# Patient Record
Sex: MALE | Race: OTHER | NOT HISPANIC OR LATINO
[De-identification: names, ages, dates, MRNs, and addresses within clinical notes are randomized per-mention and may not be internally consistent; named-entity substitution may affect disease eponyms.]

---

## 2019-05-22 ENCOUNTER — APPOINTMENT (OUTPATIENT)
Dept: UROLOGY | Facility: CLINIC | Age: 59
End: 2019-05-22
Payer: COMMERCIAL

## 2019-05-22 VITALS
HEART RATE: 68 BPM | SYSTOLIC BLOOD PRESSURE: 148 MMHG | DIASTOLIC BLOOD PRESSURE: 90 MMHG | HEIGHT: 75 IN | RESPIRATION RATE: 18 BRPM | BODY MASS INDEX: 28.6 KG/M2 | WEIGHT: 230 LBS | TEMPERATURE: 98.1 F

## 2019-05-22 DIAGNOSIS — Z00.00 ENCOUNTER FOR GENERAL ADULT MEDICAL EXAMINATION W/OUT ABNORMAL FINDINGS: ICD-10-CM

## 2019-05-22 LAB
BASOPHILS # BLD AUTO: 0.07 K/UL
BASOPHILS NFR BLD AUTO: 1.7 %
EOSINOPHIL # BLD AUTO: 0.19 K/UL
EOSINOPHIL NFR BLD AUTO: 4.5 %
HCT VFR BLD CALC: 52.3 %
HGB BLD-MCNC: 16.1 G/DL
IMM GRANULOCYTES NFR BLD AUTO: 0.5 %
LYMPHOCYTES # BLD AUTO: 1.8 K/UL
LYMPHOCYTES NFR BLD AUTO: 43.1 %
MAN DIFF?: NORMAL
MCHC RBC-ENTMCNC: 28 PG
MCHC RBC-ENTMCNC: 30.8 GM/DL
MCV RBC AUTO: 91.1 FL
MONOCYTES # BLD AUTO: 0.4 K/UL
MONOCYTES NFR BLD AUTO: 9.6 %
NEUTROPHILS # BLD AUTO: 1.7 K/UL
NEUTROPHILS NFR BLD AUTO: 40.6 %
PLATELET # BLD AUTO: 158 K/UL
PSA FREE FLD-MCNC: 45 %
PSA FREE SERPL-MCNC: 0.73 NG/ML
PSA SERPL-MCNC: 1.62 NG/ML
RBC # BLD: 5.74 M/UL
RBC # FLD: 12.9 %
WBC # FLD AUTO: 4.18 K/UL

## 2019-05-22 PROCEDURE — 99204 OFFICE O/P NEW MOD 45 MIN: CPT

## 2019-05-22 RX ORDER — HYDROCHLOROTHIAZIDE 12.5 MG/1
12.5 CAPSULE ORAL
Qty: 30 | Refills: 0 | Status: ACTIVE | COMMUNITY
Start: 2018-10-12

## 2019-05-22 RX ORDER — CLOTRIMAZOLE AND BETAMETHASONE DIPROPIONATE 10; .5 MG/G; MG/G
1-0.05 CREAM TOPICAL TWICE DAILY
Qty: 1 | Refills: 2 | Status: ACTIVE | COMMUNITY
Start: 2019-05-22 | End: 1900-01-01

## 2019-05-22 RX ORDER — OMEPRAZOLE 20 MG/1
20 TABLET, DELAYED RELEASE ORAL
Qty: 56 | Refills: 0 | Status: ACTIVE | COMMUNITY
Start: 2019-04-15

## 2019-05-22 RX ORDER — NEBIVOLOL HYDROCHLORIDE 10 MG/1
10 TABLET ORAL
Qty: 90 | Refills: 0 | Status: ACTIVE | COMMUNITY
Start: 2018-10-12

## 2019-05-22 RX ORDER — OMEPRAZOLE 40 MG/1
40 CAPSULE, DELAYED RELEASE ORAL
Qty: 90 | Refills: 0 | Status: ACTIVE | COMMUNITY
Start: 2018-06-18

## 2019-05-22 RX ORDER — AMLODIPINE BESYLATE 5 MG/1
5 TABLET ORAL
Qty: 90 | Refills: 0 | Status: ACTIVE | COMMUNITY
Start: 2018-12-26

## 2019-05-22 NOTE — PHYSICAL EXAM
[General Appearance - Well Developed] : well developed [Normal Appearance] : normal appearance [] : no respiratory distress [Normal Station and Gait] : the gait and station were normal for the patient's age [Oriented To Time, Place, And Person] : oriented to person, place, and time [Affect] : the affect was normal [Heart Rate And Rhythm] : Heart rate and rhythm were normal [Edema] : no peripheral edema [Abdomen Soft] : soft [Abdomen Tenderness] : non-tender [Costovertebral Angle Tenderness] : no ~M costovertebral angle tenderness [Penis Abnormality] : normal circumcised penis [No Palpable Adenopathy] : no palpable adenopathy [Cervical Lymph Nodes Enlarged Posterior Bilaterally] : posterior cervical [Cervical Lymph Nodes Enlarged Anterior Bilaterally] : anterior cervical [FreeTextEntry1] : no submandibular gland tenderness

## 2019-05-22 NOTE — REVIEW OF SYSTEMS
[Nocturia] : nocturia [Negative] : Heme/Lymph [Feeling Poorly] : not feeling poorly [Eyesight Problems] : no eyesight problems [Nosebleeds] : no nosebleeds [Chest Pain] : no chest pain [Cough] : no cough [Constipation] : no constipation [Joint Pain] : no joint pain [Joint Swelling] : no joint swelling [Convulsions] : no convulsions [Fainting] : no fainting [Anxiety] : no anxiety [Depression] : no depression [Erectile Dysfunction] : no erectile dysfunction [Easy Bleeding] : no tendency for easy bleeding [Easy Bruising] : no tendency for easy bruising

## 2019-05-22 NOTE — ASSESSMENT
[FreeTextEntry1] : Mr. Murdock presents today for an initial evaluation. 3 years ago he was diagnosed with colon cancer. Last year he was diagnosed with lung cancer. He under went chemotherapy and as of today he is cancer free. He has seen another provider who measured his PSA and it is reported that his PSA has increased from 1 to 2 over a two year period. Prostate is enlarged 5.5 cm, kidney normal size, no renal mass as per a CT scan completed 4/3/19. The CT scan was completed to monitor the colon cancer. He notes seeing me today as oppose to his previous provider due to a change in insurance. Regarding his urination he reports not having any issues. When drinking a lot of tea during the evening he will wake up 2-3 x a night to urinate. Otherwise he will not wake up at all or only once at night. He denies dysuria, gross hematuria, urgency or incontinence. Stones in the gal bladder are reported as well. He is also concerned about itching of the scrotum. Additionally he c/o neuropathy in the feet bilaterally which he believes as a result of chemotherapy. \par \par Digital rectal exam found no suspicious rectal masses. Anal tone is normal. The prostate is non tender with normal texture, discrete borders and no nodules. It is a 30 gram transurethral resection size. \par \par The patient produced a urine sample which will be sent for urinalysis, urine cytology and urine culture. \par Blood work today included comprehensive metabolic panel, CBC, PSA and testosterone. \par \par Upon completion of his physical examination I discovered slight swelling on the left side of the scrotum. Therefore I will prescribe a topical cream i.e. Lotrisone to be used. \par \par He is to follow up in 6 months or sooner if clinically indicated.

## 2019-05-22 NOTE — ADDENDUM
[FreeTextEntry1] : This note was authored by Steven Rodriguez working as a medical scribe for Dr. Chris Andrade. The note was reviewed, edited, and revised by Dr. Chris Andrade who is in agreement with the exam findings, and treatment plan. (5/22/19)

## 2019-05-22 NOTE — LETTER BODY
[Dear  ___] : Dear  [unfilled], [Consult Letter:] : I had the pleasure of evaluating your patient, [unfilled]. [Please see my note below.] : Please see my note below. [Consult Closing:] : Thank you very much for allowing me to participate in the care of this patient.  If you have any questions, please do not hesitate to contact me. [Sincerely,] : Sincerely, [( Thank you for referring [unfilled] for consultation for _____ )] : Thank you for referring [unfilled] for consultation for [unfilled] [FreeTextEntry2] : Tian Ferguson MD\par [address]\par [place] [address] [FreeTextEntry1] : Mr. Murdock presents today for an initial evaluation. 3 years ago he was diagnosed with colon cancer. Last year he was diagnosed with lung cancer. He under went chemotherapy and as of today he is cancer free. He has seen another provider who measured his PSA and it is reported that his PSA has increased from 1 to 2 over a two year period. Prostate is enlarged 5.5 cm, kidney normal size, no renal mass as per a CT scan completed 4/3/19. The CT scan was completed to monitor the colon cancer. He notes seeing me today as oppose to his previous provider due to a change in insurance. Regarding his urination he reports not having any issues. When drinking a lot of tea during the evening he will wake up 2-3 x a night to urinate. Otherwise he will not wake up at all or only once at night. He denies dysuria, gross hematuria, urgency or incontinence. Stones in the gal bladder are reported as well. He is also concerned about itching of the scrotum. Additionally he c/o neuropathy in the feet bilaterally which he believes as a result of chemotherapy. \par \par Digital rectal exam found no suspicious rectal masses. Anal tone is normal. The prostate is non tender with normal texture, discrete borders and no nodules. It is a 30 gram transurethral resection size. \par \par The patient produced a urine sample which will be sent for urinalysis, urine cytology and urine culture. \par Blood work today included comprehensive metabolic panel, CBC, PSA and testosterone. \par \par Upon completion of his physical examination I discovered slight swelling on the left side of the scrotum. Therefore I will prescribe a topical cream i.e. Lotrisone to be used. \par \par He is to follow up in 6 months or sooner if clinically indicated.  [FreeTextEntry3] : Chris Andrade M.D. PhD

## 2019-05-22 NOTE — HISTORY OF PRESENT ILLNESS
[FreeTextEntry1] : Mr. Murdock presents today for an initial evaluation. 3 years ago he was diagnosed with colon cancer. 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 He under went chemotherapy and as of today he is cancer free. He has seen another provider who measured his PSA and it is reported that his PSA has increased from 1 to 2 over a two year period. Prostate is enlarged 5.5 cm, kidney normal size, no renal mass as per a CT scan completed 4/3/19. The CT scan was completed to monitor the colon cancer. He notes seeing me today as oppose to his previous provider due to a change in insurance. Regarding his urination he reports not having any issues. When drinking a lot of tea during the evening he will wake up 2-3 x a night to urinate. Otherwise he will not wake up at all or only once at night. He denies dysuria, gross hematuria, urgency or incontinence. Stones in the gal bladder are reported as well. He is also concerned about itching of the scrotum. Additionally he c/o neuropathy in the feet bilaterally which he believes as a result of chemotherapy.

## 2019-05-25 LAB
ALBUMIN SERPL ELPH-MCNC: 4.4 G/DL
ALP BLD-CCNC: 131 U/L
ALT SERPL-CCNC: 28 U/L
ANION GAP SERPL CALC-SCNC: 18 MMOL/L
APPEARANCE: CLEAR
AST SERPL-CCNC: 27 U/L
BACTERIA UR CULT: NORMAL
BACTERIA: NEGATIVE
BILIRUB SERPL-MCNC: 0.7 MG/DL
BILIRUBIN URINE: NEGATIVE
BLOOD URINE: NEGATIVE
BUN SERPL-MCNC: 16 MG/DL
CALCIUM SERPL-MCNC: 10.1 MG/DL
CEA SERPL-MCNC: 1.4 NG/ML
CHLORIDE SERPL-SCNC: 103 MMOL/L
CO2 SERPL-SCNC: 22 MMOL/L
COLOR: NORMAL
CREAT SERPL-MCNC: 0.84 MG/DL
GLUCOSE QUALITATIVE U: NEGATIVE
GLUCOSE SERPL-MCNC: 93 MG/DL
HYALINE CASTS: 0 /LPF
KETONES URINE: NEGATIVE
LEUKOCYTE ESTERASE URINE: NEGATIVE
MICROSCOPIC-UA: NORMAL
NITRITE URINE: NEGATIVE
PH URINE: 6.5
POTASSIUM SERPL-SCNC: 4.5 MMOL/L
PROT SERPL-MCNC: 7.1 G/DL
PROTEIN URINE: NEGATIVE
RED BLOOD CELLS URINE: 0 /HPF
SODIUM SERPL-SCNC: 143 MMOL/L
SPECIFIC GRAVITY URINE: 1.01
SQUAMOUS EPITHELIAL CELLS: 0 /HPF
TESTOST SERPL-MCNC: 368 NG/DL
URINE CYTOLOGY: NORMAL
UROBILINOGEN URINE: NORMAL
WHITE BLOOD CELLS URINE: 0 /HPF

## 2019-12-02 ENCOUNTER — APPOINTMENT (OUTPATIENT)
Dept: UROLOGY | Facility: CLINIC | Age: 59
End: 2019-12-02
Payer: COMMERCIAL

## 2019-12-02 VITALS — SYSTOLIC BLOOD PRESSURE: 142 MMHG | DIASTOLIC BLOOD PRESSURE: 82 MMHG | TEMPERATURE: 98.1 F | HEART RATE: 65 BPM

## 2019-12-02 PROCEDURE — 99214 OFFICE O/P EST MOD 30 MIN: CPT

## 2019-12-02 NOTE — HISTORY OF PRESENT ILLNESS
[FreeTextEntry1] : Mr. Murdock presents today for an initial evaluation. 3 years ago he was diagnosed with colon cancer. 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 He under went chemotherapy and as of today he is cancer free. He has seen another provider who measured his PSA and it is reported that his PSA has increased from 1 to 2 over a two year period. Prostate is enlarged 5.5 cm, kidney normal size, no renal mass as per a CT scan completed 4/3/19. The CT scan was completed to monitor the colon cancer. He notes seeing me today as oppose to his previous provider due to a change in insurance. Regarding his urination he reports not having any issues. When drinking a lot of tea during the evening he will wake up 2-3 x a night to urinate. Otherwise he will not wake up at all or only once at night. He denies dysuria, gross hematuria, urgency or incontinence. Stones in the gal bladder are reported as well. He is also concerned about itching of the scrotum. Additionally he c/o neuropathy in the feet bilaterally which he believes as a result of chemotherapy. \par \par 12/2/19: Patient presents today for a follow up. Pt states he sees his oncologist once a month to get blood work. Patients hematocrit numbers are from  3/19 was 48.6, 6/19 was 48.4 and 8/19 was 40.1. Left lower lobe colon cancer excised and remains cancer free. CT chest and abdomen from 10/16/19 shows  thyroid nodules, stable 3mm left upper lobe nodule, right subcutaneous port, prostate was 5.9 cm. Patient denies dysuria, gross hematuria, urgency, or incontinence, pushing or straining. Pt states he wakes up about 1-2 times at night to urinate if he drinks a lot of tea before bed.

## 2019-12-02 NOTE — PHYSICAL EXAM
[General Appearance - Well Developed] : well developed [Abdomen Soft] : soft [Normal Appearance] : normal appearance [Abdomen Tenderness] : non-tender [Costovertebral Angle Tenderness] : no ~M costovertebral angle tenderness [Heart Rate And Rhythm] : Heart rate and rhythm were normal [Edema] : no peripheral edema [] : no respiratory distress [Affect] : the affect was normal [Oriented To Time, Place, And Person] : oriented to person, place, and time [Normal Station and Gait] : the gait and station were normal for the patient's age [No Palpable Adenopathy] : no palpable adenopathy [Cervical Lymph Nodes Enlarged Posterior Bilaterally] : posterior cervical [Cervical Lymph Nodes Enlarged Anterior Bilaterally] : anterior cervical [Penis Abnormality] : normal circumcised penis [FreeTextEntry1] : no submandibular gland tenderness

## 2019-12-02 NOTE — ADDENDUM
[FreeTextEntry1] : This note was authored by Yocasta Velez working as a scribe for Dr. Chris Andrade\par

## 2019-12-03 LAB
APPEARANCE: CLEAR
BILIRUBIN URINE: NEGATIVE
BLOOD URINE: NEGATIVE
COLOR: NORMAL
GLUCOSE QUALITATIVE U: NEGATIVE
KETONES URINE: NEGATIVE
LEUKOCYTE ESTERASE URINE: NEGATIVE
NITRITE URINE: NEGATIVE
PH URINE: 6.5
PROTEIN URINE: NEGATIVE
PSA SERPL-MCNC: 1.48 NG/ML
SPECIFIC GRAVITY URINE: 1.02
TESTOST SERPL-MCNC: 395 NG/DL
URINE CYTOLOGY: NORMAL
UROBILINOGEN URINE: NORMAL

## 2019-12-08 LAB
ALKPISO INTERP: NORMAL
ALP BLD-CCNC: 118 U/L
ALP BONE CFR SERPL: 41 %
ALP INTEST CFR SERPL: 0 %
ALP LIVER CFR SERPL: 59 %
ALP MACRO CFR SERPL: 0 %
ALP PLAC CFR SERPL: 0 %

## 2020-06-08 ENCOUNTER — APPOINTMENT (OUTPATIENT)
Dept: UROLOGY | Facility: CLINIC | Age: 60
End: 2020-06-08

## 2020-07-15 ENCOUNTER — APPOINTMENT (OUTPATIENT)
Dept: UROLOGY | Facility: CLINIC | Age: 60
End: 2020-07-15

## 2020-07-23 ENCOUNTER — LABORATORY RESULT (OUTPATIENT)
Age: 60
End: 2020-07-23

## 2020-07-23 ENCOUNTER — APPOINTMENT (OUTPATIENT)
Dept: UROLOGY | Facility: CLINIC | Age: 60
End: 2020-07-23
Payer: COMMERCIAL

## 2020-07-23 VITALS
BODY MASS INDEX: 26.11 KG/M2 | HEIGHT: 75 IN | HEART RATE: 73 BPM | RESPIRATION RATE: 18 BRPM | SYSTOLIC BLOOD PRESSURE: 143 MMHG | DIASTOLIC BLOOD PRESSURE: 83 MMHG | WEIGHT: 210 LBS

## 2020-07-23 VITALS — TEMPERATURE: 97.6 F

## 2020-07-23 PROCEDURE — 99214 OFFICE O/P EST MOD 30 MIN: CPT

## 2020-07-26 LAB
APPEARANCE: ABNORMAL
BILIRUBIN URINE: NEGATIVE
BLOOD URINE: NEGATIVE
COLOR: YELLOW
GLUCOSE QUALITATIVE U: NEGATIVE
KETONES URINE: NEGATIVE
LEUKOCYTE ESTERASE URINE: NEGATIVE
NITRITE URINE: NEGATIVE
PH URINE: 6
PROTEIN URINE: ABNORMAL
SPECIFIC GRAVITY URINE: 1.04
UROBILINOGEN URINE: ABNORMAL

## 2020-07-26 NOTE — PHYSICAL EXAM
[General Appearance - Well Developed] : well developed [Normal Appearance] : normal appearance [Abdomen Tenderness] : non-tender [Abdomen Soft] : soft [Penis Abnormality] : normal circumcised penis [Costovertebral Angle Tenderness] : no ~M costovertebral angle tenderness [Heart Rate And Rhythm] : Heart rate and rhythm were normal [Edema] : no peripheral edema [Oriented To Time, Place, And Person] : oriented to person, place, and time [] : no respiratory distress [Affect] : the affect was normal [Normal Station and Gait] : the gait and station were normal for the patient's age [No Palpable Adenopathy] : no palpable adenopathy [Cervical Lymph Nodes Enlarged Posterior Bilaterally] : posterior cervical [Cervical Lymph Nodes Enlarged Anterior Bilaterally] : anterior cervical [Prostate Size ___ gm] : prostate size [unfilled] gm [FreeTextEntry1] : hand  is 5/5 bilaterally

## 2020-07-26 NOTE — HISTORY OF PRESENT ILLNESS
[FreeTextEntry1] : Mr. Murdock presents today for an initial evaluation. 3 years ago he was diagnosed with colon cancer. 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 He under went chemotherapy and as of today he is cancer free. He has seen another provider who measured his PSA and it is reported that his PSA has increased from 1 to 2 over a two year period. Prostate is enlarged 5.5 cm, kidney normal size, no renal mass as per a CT scan completed 4/3/19. The CT scan was completed to monitor the colon cancer. He notes seeing me today as oppose to his previous provider due to a change in insurance. Regarding his urination he reports not having any issues. When drinking a lot of tea during the evening he will wake up 2-3 x a night to urinate. Otherwise he will not wake up at all or only once at night. He denies dysuria, gross hematuria, urgency or incontinence. Stones in the gal bladder are reported as well. He is also concerned about itching of the scrotum. Additionally he c/o neuropathy in the feet bilaterally which he believes as a result of chemotherapy. \par 12/2/19: Patient presents today for a follow up. Pt states he sees his oncologist once a month to get blood work. Patients hematocrit numbers are from  3/19 was 48.6, 6/19 was 48.4 and 8/19 was 40.1. Left lower lobe colon cancer excised and remains cancer free. CT chest and abdomen from 10/16/19 shows  thyroid nodules, stable 3mm left upper lobe nodule, right subcutaneous port, prostate was 5.9 cm. Patient denies dysuria, gross hematuria, urgency, or incontinence, pushing or straining. Pt states he wakes up about 1-2 times at night to urinate if he drinks a lot of tea before bed. \par \par 07/23/2020: Patient presents today for a follow up. On 12/03/19, PSA was 1.48, Testosterone was 395 and ALK PHOS was 118. 5 years ago pt was diagnosed with colon cancer. 2 years ago, 2 spots were found in the lungs. CT scan 2-3 months ago at [place] shows spots are increasing in size. Surgeon advised him to have spots removed. Pt had lung resection. Has had two rounds of chemotherapy. No urinary symptoms. No on medication for urine. Pt reports his sexual desire is low. Hx of stones.

## 2020-07-26 NOTE — ADDENDUM
[FreeTextEntry1] : This note was authored by Tiera Cerda working as scribe for Dr. Chris Andrade. I, Dr. Chris Andrade, have reviewed the content of this note and confirm it is true and accurate. I personally performed the history and physical examination and made all the decisions.\par 07/23/2020

## 2020-07-26 NOTE — ASSESSMENT
[FreeTextEntry1] : 12/2/19: Patient presents today for a follow up. Pt states he sees his oncologist once a month to get blood work. Patients hematocrit numbers are from  3/19 was 48.6, 6/19 was 48.4 and 8/19 was 40.1. Left lower lobe colon cancer excised and remains cancer free. CT chest and abdomen from 10/16/19 shows  thyroid nodules, stable 3mm left upper lobe nodule, right subcutaneous port, prostate was 5.9 cm. Patient denies dysuria, gross hematuria, urgency, or incontinence, pushing or straining. Pt states he wakes up about 1-2 times at night to urinate if he drinks a lot of tea before bed. The patient produced a urine sample which will be sent for urinalysis, urine cytology and urine culture. Blood work today included comprehensive metabolic panel, CBC, PSA and testosterone. He is to follow up in 6 months or sooner if clinically indicated. \par \par 07/23/2020: Patient presents today for a follow up. On 12/03/19, PSA was 1.48, Testosterone was 395 and ALK PHOS was 118. 5 years ago pt was diagnosed with colon cancer. 2 years ago, 2 spots were found in the lungs. CT scan 2-3 months ago at [place] shows spots are increasing in size. Surgeon advised him to have spots removed. Pt had lung resection. Has had two rounds of chemotherapy. No urinary symptoms. No on medication for urine. Pt reports his sexual desire is low. Hx of stones. \par \par Digital rectal exam found no suspicious rectal mucosal masses. Anal tone is normal. The prostate is non tender, with normal texture, discrete borders, and no nodules. No gross blood on exam finger. 40 TUR. \par \par Pt was advised to increase fluid intake. \par \par Prescribed Tadalafil. Warned of side effects. \par \par Pt will provide a urine sample today for culture, cytology and analysis. Urine was dark yellow. \par \par RTO in 6 months.

## 2020-07-26 NOTE — REVIEW OF SYSTEMS
[Nocturia] : nocturia [Negative] : Heme/Lymph [Eyesight Problems] : no eyesight problems [Feeling Poorly] : not feeling poorly [Cough] : no cough [Chest Pain] : no chest pain [Nosebleeds] : no nosebleeds [Joint Pain] : no joint pain [Constipation] : no constipation [Joint Swelling] : no joint swelling [Fainting] : no fainting [Convulsions] : no convulsions [Anxiety] : no anxiety [Erectile Dysfunction] : no erectile dysfunction [Depression] : no depression [Easy Bleeding] : no tendency for easy bleeding [Easy Bruising] : no tendency for easy bruising

## 2020-12-21 ENCOUNTER — APPOINTMENT (OUTPATIENT)
Dept: UROLOGY | Facility: CLINIC | Age: 60
End: 2020-12-21
Payer: COMMERCIAL

## 2020-12-21 VITALS
HEART RATE: 65 BPM | BODY MASS INDEX: 26.11 KG/M2 | DIASTOLIC BLOOD PRESSURE: 87 MMHG | SYSTOLIC BLOOD PRESSURE: 132 MMHG | TEMPERATURE: 96.8 F | WEIGHT: 210 LBS | RESPIRATION RATE: 16 BRPM | HEIGHT: 75 IN

## 2020-12-21 PROCEDURE — 99072 ADDL SUPL MATRL&STAF TM PHE: CPT

## 2020-12-21 PROCEDURE — 99214 OFFICE O/P EST MOD 30 MIN: CPT

## 2020-12-26 LAB
ALBUMIN SERPL ELPH-MCNC: 4.6 G/DL
ALP BLD-CCNC: 121 U/L
ALT SERPL-CCNC: 20 U/L
ANION GAP SERPL CALC-SCNC: 9 MMOL/L
AST SERPL-CCNC: 25 U/L
BILIRUB SERPL-MCNC: 0.9 MG/DL
BUN SERPL-MCNC: 17 MG/DL
CALCIUM SERPL-MCNC: 10 MG/DL
CHLORIDE SERPL-SCNC: 100 MMOL/L
CO2 SERPL-SCNC: 30 MMOL/L
CREAT SERPL-MCNC: 0.86 MG/DL
GLUCOSE SERPL-MCNC: 79 MG/DL
POTASSIUM SERPL-SCNC: 4.6 MMOL/L
PROT SERPL-MCNC: 7 G/DL
PSA FREE FLD-MCNC: 43 %
PSA FREE SERPL-MCNC: 0.77 NG/ML
PSA SERPL-MCNC: 1.76 NG/ML
SODIUM SERPL-SCNC: 140 MMOL/L
TESTOST BND SERPL-MCNC: 5.3 PG/ML
TESTOST SERPL-MCNC: 346.3 NG/DL

## 2020-12-27 NOTE — HISTORY OF PRESENT ILLNESS
[FreeTextEntry1] : 60 yr male history of colon cancer with recurrent pulm metastatic disease following with Hemon[facility] Dr David Murillo at [facility].  BPH , kidney stone that is indolent.  \par No urinary symptoms. \par PSA 1.48 December 2019 \par Recent Abd Pelvic CT with contrast 12/17/20 showing -\par Left upper and lower lung lobe wedge resection\par Right hemicolectomy \par 6.1cm prostamegaly, intravesical protrusion \par +FHx kidney stones in father. \par \par \par  [Urinary Incontinence] : no urinary incontinence [Urinary Retention] : no urinary retention [Urinary Urgency] : no urinary urgency [Urinary Frequency] : no urinary frequency [Nocturia] : no nocturia [Straining] : no straining [Weak Stream] : no weak stream [Intermittency] : no intermittency

## 2020-12-27 NOTE — PHYSICAL EXAM
[General Appearance - Well Developed] : well developed [Normal Appearance] : normal appearance [General Appearance - In No Acute Distress] : no acute distress [Bowel Sounds] : normal bowel sounds [Skin Color & Pigmentation] : normal skin color and pigmentation [Heart Rate And Rhythm] : Heart rate and rhythm were normal [Edema] : no peripheral edema [] : no respiratory distress [Oriented To Time, Place, And Person] : oriented to person, place, and time [Normal Station and Gait] : the gait and station were normal for the patient's age [FreeTextEntry1] : red patch of right anterior tibia

## 2020-12-27 NOTE — REVIEW OF SYSTEMS
[Fever] : no fever [Chills] : no chills [Eye Pain] : no eye pain [Red Eyes] : eyes not red [Earache] : no earache [Loss Of Hearing] : no hearing loss [Heart Rate Is Slow] : the heart rate was not slow [Chest Pain] : no chest pain [Shortness Of Breath] : no shortness of breath [Cough] : no cough [Dysuria] : no dysuria [Arthralgias] : no arthralgias [Skin Lesions] : no skin lesions [Suicidal] : not suicidal [Anxiety] : no anxiety [Depression] : no depression [Proptosis] : no proptosis [Easy Bleeding] : no tendency for easy bleeding

## 2020-12-27 NOTE — ASSESSMENT
[FreeTextEntry1] : 60 yr male status post right hemicolectomy for colon cancer, lung mets status post left upper and lower lobe wedge resection.  \par BPH no urinary symptoms. \par No ED. \par \par Digital rectal exam found no suspicious rectal masses. No rectal mucosal lesions. Anal tone is normal. The prostate is non tender, with normal texture, discrete borders, and no nodules. It is a 35 gram transurethral resection size. No gross blood on examining fingers. \par \par Recommend hydrocortisone cream for his skin rash. \par \par To obtain \par PSA\par Testosterone \par UA, \par CMP\par \par RTO in 6 months for renal US.

## 2020-12-30 ENCOUNTER — NON-APPOINTMENT (OUTPATIENT)
Age: 60
End: 2020-12-30

## 2021-06-21 ENCOUNTER — APPOINTMENT (OUTPATIENT)
Dept: UROLOGY | Facility: CLINIC | Age: 61
End: 2021-06-21

## 2021-08-10 ENCOUNTER — APPOINTMENT (OUTPATIENT)
Dept: UROLOGY | Facility: CLINIC | Age: 61
End: 2021-08-10
Payer: COMMERCIAL

## 2021-08-10 PROCEDURE — 99214 OFFICE O/P EST MOD 30 MIN: CPT

## 2021-08-10 RX ORDER — PHENAZOPYRIDINE HYDROCHLORIDE 200 MG/1
200 TABLET ORAL 3 TIMES DAILY
Qty: 30 | Refills: 3 | Status: ACTIVE | COMMUNITY
Start: 2021-08-10 | End: 1900-01-01

## 2021-08-10 RX ORDER — TADALAFIL 20 MG/1
20 TABLET ORAL
Qty: 6 | Refills: 11 | Status: COMPLETED | COMMUNITY
Start: 2020-07-23 | End: 2021-08-10

## 2021-08-10 NOTE — ADDENDUM
[FreeTextEntry1] : I, Kristi Metcalfin, acted solely as a scribe for Dr. Chris Andrade on this date 08/10/2021.\par \par All medical record entries made by the Scribe were at my, Dr. Chris Andrade, direction and personally dictated by me on 08/10/2021. I have reviewed the chart and agree that the record accurately reflects my personal performance of the history, physical exam, assessment and plan.  I have also personally directed, reviewed and agreed with the chart.

## 2021-08-10 NOTE — PHYSICAL EXAM
[General Appearance - Well Developed] : well developed [Normal Appearance] : normal appearance [General Appearance - In No Acute Distress] : no acute distress [Bowel Sounds] : normal bowel sounds [Skin Color & Pigmentation] : normal skin color and pigmentation [Heart Rate And Rhythm] : Heart rate and rhythm were normal [Edema] : no peripheral edema [] : no respiratory distress [Oriented To Time, Place, And Person] : oriented to person, place, and time [Normal Station and Gait] : the gait and station were normal for the patient's age [No Focal Deficits] : no focal deficits [FreeTextEntry1] : red patch of right anterior tibia

## 2021-08-10 NOTE — ASSESSMENT
[FreeTextEntry1] : Mr. Murdock presents today for an initial evaluation. 3 years ago he was diagnosed with colon cancer. 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 He under went chemotherapy and as of today he is cancer free. He has seen another provider who measured his PSA and it is reported that his PSA has increased from 1 to 2 over a two year period. Prostate is enlarged 5.5 cm, kidney normal size, no renal mass as per a CT scan completed 4/3/19. The CT scan was completed to monitor the colon cancer. He notes seeing me today as oppose to his previous provider due to a change in insurance. Regarding his urination he reports not having any issues. When drinking a lot of tea during the evening he will wake up 2-3 x a night to urinate. Otherwise he will not wake up at all or only once at night. He denies dysuria, gross hematuria, urgency or incontinence. Stones in the gal bladder are reported as well. He is also concerned about itching of the scrotum. Additionally he c/o neuropathy in the feet bilaterally which he believes as a result of chemotherapy. \par 12/2/19: Patient presents today for a follow up. Pt states he sees his oncologist once a month to get blood work. Patients hematocrit numbers are from 3/19 was 48.6, 6/19 was 48.4 and 8/19 was 40.1. Left lower lobe colon cancer excised and remains cancer free. CT chest and abdomen from 10/16/19 shows thyroid nodules, stable 3mm left upper lobe nodule, right subcutaneous port, prostate was 5.9 cm. Patient denies dysuria, gross hematuria, urgency, or incontinence, pushing or straining. Pt states he wakes up about 1-2 times at night to urinate if he drinks a lot of tea before bed. \par \par 07/23/2020: Patient presents today for a follow up. On 12/03/19, PSA was 1.48, Testosterone was 395 and ALK PHOS was 118. 5 years ago pt was diagnosed with colon cancer. 2 years ago, 2 spots were found in the lungs. CT scan 2-3 months ago at [place] shows spots are increasing in size. Surgeon advised him to have spots removed. Pt had lung resection. Has had two rounds of chemotherapy. No urinary symptoms. No on medication for urine. Pt reports his sexual desire is low. Hx of stones. \par \par 12/21/2020: 60 yr male status post right hemicolectomy for colon cancer, lung mets status post left upper and lower lobe wedge resection.  \par BPH no urinary symptoms. \par No ED. \par \par 08/10/2021: Patient presents today for follow up. Had CT scan two weeks ago for hx of metastatic colon cancer which was normal. Has ventral fat-containing hernia that he will have repaired. Atelectasis or scarring of left lung base, stable. Stable post operative changes related to prior left upper lobe and left lower lobe wedge resections. Liver shows no hepatic masses. Thyroid had multiple nodules. Erections are normal. Not on medications for erections. PSA 12/21/20 was 1.76. Patient states he had an episode of burning and incontinence. He went to see PCP who prescribed him Cipro x 3 days which resolved the pain. However, after stopping, burning pain returned but no longer has incontinence. \par \par Recommend patient to see endocrinologist to evaluate thyroid nodules. I referred the patient to endocrinologist Dr. Meliza Sandhu. \par \par I will prescribe the patient Phenazopyridine 200mg TID x 2 days. I informed him that his urine will become reddish orange and that this is not a worry. \par \par The patient produced a urine sample which will be sent for osmolality, urinalysis, urine cytology, and urine culture. Instructed to call in 3 days for urine culture results so the appropriate antibiotic can be prescribed if necessary. \par Blood work today includes alkaline phosphatase, BMP, dihydrotestosterone, PSA, testosterone, magnesium, osmolality serum, uric acid. \par \par We will defer digital rectal exam due to possible UTI. \par \par RTO in 2 weeks for reassessment. \par \par Preparation, in-person office visit, and coordination of care took: 30 minutes

## 2021-08-10 NOTE — HISTORY OF PRESENT ILLNESS
[FreeTextEntry1] : Mr. Murdock presents today for an initial evaluation. 3 years ago he was diagnosed with colon cancer. 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 He under went chemotherapy and as of today he is cancer free. He has seen another provider who measured his PSA and it is reported that his PSA has increased from 1 to 2 over a two year period. Prostate is enlarged 5.5 cm, kidney normal size, no renal mass as per a CT scan completed 4/3/19. The CT scan was completed to monitor the colon cancer. He notes seeing me today as oppose to his previous provider due to a change in insurance. Regarding his urination he reports not having any issues. When drinking a lot of tea during the evening he will wake up 2-3 x a night to urinate. Otherwise he will not wake up at all or only once at night. He denies dysuria, gross hematuria, urgency or incontinence. Stones in the gal bladder are reported as well. He is also concerned about itching of the scrotum. Additionally he c/o neuropathy in the feet bilaterally which he believes as a result of chemotherapy. \par 12/2/19: Patient presents today for a follow up. Pt states he sees his oncologist once a month to get blood work. Patients hematocrit numbers are from 3/19 was 48.6, 6/19 was 48.4 and 8/19 was 40.1. Left lower lobe colon cancer excised and remains cancer free. CT chest and abdomen from 10/16/19 shows thyroid nodules, stable 3mm left upper lobe nodule, right subcutaneous port, prostate was 5.9 cm. Patient denies dysuria, gross hematuria, urgency, or incontinence, pushing or straining. Pt states he wakes up about 1-2 times at night to urinate if he drinks a lot of tea before bed. \par \par 07/23/2020: Patient presents today for a follow up. On 12/03/19, PSA was 1.48, Testosterone was 395 and ALK PHOS was 118. 5 years ago pt was diagnosed with colon cancer. 2 years ago, 2 spots were found in the lungs. CT scan 2-3 months ago at [place] shows spots are increasing in size. Surgeon advised him to have spots removed. Pt had lung resection. Has had two rounds of chemotherapy. No urinary symptoms. No on medication for urine. Pt reports his sexual desire is low. Hx of stones. \par \par 12/21/2020: 60 yr male history of colon cancer with recurrent pulm metastatic disease following with Hemonc Dr David Murillo at [facility].  BPH , kidney stone that is indolent.  \par No urinary symptoms. \par PSA 1.48 December 2019 \par Recent Abd Pelvic CT with contrast 12/17/20 showing -\par Left upper and lower lung lobe wedge resection\par Right hemicolectomy \par 6.1cm prostamegaly, intravesical protrusion \par +FHx kidney stones in father. \par \par 08/10/2021: Patient presents today for follow up. Had CT scan two weeks ago for hx of metastatic colon cancer which was normal. Has ventral fat-containing hernia that he will have repaired. Atelectasis or scarring of left lung base, stable. Stable post operative changes related to prior left upper lobe and left lower lobe wedge resections. Liver shows no hepatic masses. Thyroid had multiple nodules. Erections are normal. Not on medications for erections. PSA 12/21/20 was 1.76. Patient states he had an episode of burning and incontinence. He went to see PCP who prescribed him Cipro x 3 days which resolved the pain. However, after stopping, burning pain returned but no longer has incontinence.

## 2021-08-11 ENCOUNTER — APPOINTMENT (OUTPATIENT)
Dept: UROLOGY | Facility: CLINIC | Age: 61
End: 2021-08-11

## 2021-08-16 ENCOUNTER — NON-APPOINTMENT (OUTPATIENT)
Age: 61
End: 2021-08-16

## 2021-08-17 ENCOUNTER — NON-APPOINTMENT (OUTPATIENT)
Age: 61
End: 2021-08-17

## 2021-08-18 LAB
ALP BLD-CCNC: 108 U/L
ANION GAP SERPL CALC-SCNC: 13 MMOL/L
APPEARANCE: CLEAR
BACTERIA UR CULT: NORMAL
BACTERIA: NEGATIVE
BILIRUBIN URINE: NEGATIVE
BLOOD URINE: NEGATIVE
BUN SERPL-MCNC: 16 MG/DL
CALCIUM SERPL-MCNC: 10 MG/DL
CHLORIDE SERPL-SCNC: 102 MMOL/L
CO2 SERPL-SCNC: 26 MMOL/L
COLOR: NORMAL
CREAT SERPL-MCNC: 0.9 MG/DL
GLUCOSE QUALITATIVE U: NEGATIVE
GLUCOSE SERPL-MCNC: 101 MG/DL
HYALINE CASTS: 0 /LPF
KETONES URINE: NEGATIVE
LEUKOCYTE ESTERASE URINE: NEGATIVE
MAGNESIUM SERPL-MCNC: 2.2 MG/DL
MICROSCOPIC-UA: NORMAL
NITRITE URINE: NEGATIVE
OSMOLALITY SERPL: 295 MOSMOL/KG
OSMOLALITY UR: 633 MOSM/KG
PH URINE: 7
PHOSPHATE SERPL-MCNC: 2.9 MG/DL
POTASSIUM SERPL-SCNC: 4.5 MMOL/L
PROTEIN URINE: NORMAL
PSA FREE FLD-MCNC: 42 %
PSA FREE SERPL-MCNC: 0.71 NG/ML
PSA SERPL-MCNC: 1.69 NG/ML
RED BLOOD CELLS URINE: 1 /HPF
SODIUM SERPL-SCNC: 141 MMOL/L
SPECIFIC GRAVITY URINE: 1.02
SQUAMOUS EPITHELIAL CELLS: 0 /HPF
TESTOST BND SERPL-MCNC: 7.9 PG/ML
TESTOSTERONE TOTAL S: 450 NG/DL
URATE SERPL-MCNC: 5 MG/DL
URINE CYTOLOGY: NORMAL
UROBILINOGEN URINE: NORMAL
WHITE BLOOD CELLS URINE: 0 /HPF

## 2021-08-19 LAB — ANDROSTANOLONE SERPL-MCNC: 71 NG/DL

## 2021-09-23 ENCOUNTER — APPOINTMENT (OUTPATIENT)
Dept: UROLOGY | Facility: CLINIC | Age: 61
End: 2021-09-23

## 2022-01-12 ENCOUNTER — APPOINTMENT (OUTPATIENT)
Dept: UROLOGY | Facility: CLINIC | Age: 62
End: 2022-01-12
Payer: COMMERCIAL

## 2022-01-12 VITALS
BODY MASS INDEX: 27.35 KG/M2 | RESPIRATION RATE: 16 BRPM | DIASTOLIC BLOOD PRESSURE: 86 MMHG | SYSTOLIC BLOOD PRESSURE: 135 MMHG | HEART RATE: 58 BPM | WEIGHT: 220 LBS | HEIGHT: 75 IN | OXYGEN SATURATION: 96 %

## 2022-01-12 PROCEDURE — 99214 OFFICE O/P EST MOD 30 MIN: CPT

## 2022-01-13 LAB
APPEARANCE: CLEAR
BACTERIA: NEGATIVE
BASOPHILS # BLD AUTO: 0.05 K/UL
BASOPHILS NFR BLD AUTO: 0.9 %
BILIRUBIN URINE: NEGATIVE
BLOOD URINE: NEGATIVE
COLOR: NORMAL
EOSINOPHIL # BLD AUTO: 0.15 K/UL
EOSINOPHIL NFR BLD AUTO: 2.6 %
GLUCOSE QUALITATIVE U: NEGATIVE
HCT VFR BLD CALC: 51.1 %
HGB BLD-MCNC: 16.6 G/DL
HYALINE CASTS: 0 /LPF
IMM GRANULOCYTES NFR BLD AUTO: 0 %
KETONES URINE: NEGATIVE
LEUKOCYTE ESTERASE URINE: NEGATIVE
LYMPHOCYTES # BLD AUTO: 2.46 K/UL
LYMPHOCYTES NFR BLD AUTO: 43.3 %
MAN DIFF?: NORMAL
MCHC RBC-ENTMCNC: 30 PG
MCHC RBC-ENTMCNC: 32.5 GM/DL
MCV RBC AUTO: 92.4 FL
MICROSCOPIC-UA: NORMAL
MONOCYTES # BLD AUTO: 0.46 K/UL
MONOCYTES NFR BLD AUTO: 8.1 %
NEUTROPHILS # BLD AUTO: 2.56 K/UL
NEUTROPHILS NFR BLD AUTO: 45.1 %
NITRITE URINE: NEGATIVE
PH URINE: 6
PLATELET # BLD AUTO: 190 K/UL
PROTEIN URINE: NEGATIVE
PSA FREE FLD-MCNC: 44 %
PSA FREE SERPL-MCNC: 0.94 NG/ML
PSA SERPL-MCNC: 2.13 NG/ML
RBC # BLD: 5.53 M/UL
RBC # FLD: 11.8 %
RED BLOOD CELLS URINE: 1 /HPF
SPECIFIC GRAVITY URINE: 1.02
SQUAMOUS EPITHELIAL CELLS: 0 /HPF
URINE CYTOLOGY: NORMAL
UROBILINOGEN URINE: NORMAL
WBC # FLD AUTO: 5.68 K/UL
WHITE BLOOD CELLS URINE: 0 /HPF

## 2022-01-14 LAB
ALBUMIN SERPL ELPH-MCNC: 5.2 G/DL
ALP BLD-CCNC: 115 U/L
ALT SERPL-CCNC: 31 U/L
ANION GAP SERPL CALC-SCNC: 19 MMOL/L
AST SERPL-CCNC: 29 U/L
BACTERIA UR CULT: NORMAL
BILIRUB SERPL-MCNC: 1.1 MG/DL
BUN SERPL-MCNC: 22 MG/DL
CALCIUM SERPL-MCNC: 10.9 MG/DL
CHLORIDE SERPL-SCNC: 100 MMOL/L
CO2 SERPL-SCNC: 23 MMOL/L
CREAT SERPL-MCNC: 0.83 MG/DL
GLUCOSE SERPL-MCNC: 92 MG/DL
POTASSIUM SERPL-SCNC: 4.7 MMOL/L
PROT SERPL-MCNC: 8.1 G/DL
SODIUM SERPL-SCNC: 142 MMOL/L

## 2022-01-15 LAB
CEA SERPL-MCNC: 1.7 NG/ML
CORTIS SERPL-MCNC: 7.6 UG/DL
DHEA-S SERPL-MCNC: 214 UG/DL
ESTRADIOL SERPL-MCNC: 27 PG/ML
FSH SERPL-MCNC: 3.6 IU/L
LH SERPL-ACNC: 4.8 IU/L
PROGEST SERPL-MCNC: 0.2 NG/ML
PROLACTIN SERPL-MCNC: 5.7 NG/ML
SHBG SERPL-SCNC: 51.2 NMOL/L
TESTOST FREE SERPL-MCNC: 10.2 PG/ML
TESTOST SERPL-MCNC: 459 NG/DL
TSH SERPL-ACNC: 0.82 UIU/ML

## 2022-01-16 LAB
ANDROST SERPL-MCNC: 95 NG/DL
ESTROGEN SERPL-MCNC: 90 PG/ML

## 2022-01-19 ENCOUNTER — NON-APPOINTMENT (OUTPATIENT)
Age: 62
End: 2022-01-19

## 2022-01-21 LAB
ANDROSTANOLONE SERPL-MCNC: 58 NG/DL
DHEA SERPL-MCNC: 150 NG/DL

## 2022-02-12 LAB
ALBUMIN SERPL ELPH-MCNC: 4.4 G/DL
ANION GAP SERPL CALC-SCNC: 11 MMOL/L
BUN SERPL-MCNC: 12 MG/DL
CA-I SERPL-SCNC: 1.26 MMOL/L
CALCIUM SERPL-MCNC: 9.9 MG/DL
CALCIUM SERPL-MCNC: 9.9 MG/DL
CHLORIDE SERPL-SCNC: 102 MMOL/L
CO2 SERPL-SCNC: 27 MMOL/L
CREAT SERPL-MCNC: 0.84 MG/DL
GLUCOSE SERPL-MCNC: 99 MG/DL
MAGNESIUM SERPL-MCNC: 2.2 MG/DL
PARATHYROID HORMONE INTACT: 37 PG/ML
PHOSPHATE SERPL-MCNC: 2.5 MG/DL
POTASSIUM SERPL-SCNC: 4.2 MMOL/L
PROT SERPL-MCNC: 7.2 G/DL
SODIUM SERPL-SCNC: 140 MMOL/L

## 2022-03-30 ENCOUNTER — RESULT REVIEW (OUTPATIENT)
Age: 62
End: 2022-03-30

## 2022-07-17 ENCOUNTER — NON-APPOINTMENT (OUTPATIENT)
Age: 62
End: 2022-07-17

## 2022-08-04 ENCOUNTER — APPOINTMENT (OUTPATIENT)
Dept: UROLOGY | Facility: CLINIC | Age: 62
End: 2022-08-04

## 2022-08-04 VITALS
OXYGEN SATURATION: 99 % | SYSTOLIC BLOOD PRESSURE: 123 MMHG | RESPIRATION RATE: 16 BRPM | DIASTOLIC BLOOD PRESSURE: 80 MMHG | TEMPERATURE: 97.5 F | HEART RATE: 62 BPM

## 2022-08-04 PROCEDURE — 99215 OFFICE O/P EST HI 40 MIN: CPT

## 2022-08-06 DIAGNOSIS — R97.20 ELEVATED PROSTATE, SPECIFIC ANTIGEN [PSA]: ICD-10-CM

## 2022-08-06 LAB
ALBUMIN SERPL ELPH-MCNC: 4.9 G/DL
ALP BLD-CCNC: 93 U/L
ANION GAP SERPL CALC-SCNC: 11 MMOL/L
APPEARANCE: CLEAR
BACTERIA UR CULT: NORMAL
BACTERIA: NEGATIVE
BILIRUBIN URINE: NEGATIVE
BLOOD URINE: NEGATIVE
BUN SERPL-MCNC: 16 MG/DL
CALCIUM SERPL-MCNC: 10 MG/DL
CALCIUM SERPL-MCNC: 10 MG/DL
CHLORIDE SERPL-SCNC: 102 MMOL/L
CO2 SERPL-SCNC: 27 MMOL/L
COLOR: YELLOW
CREAT SERPL-MCNC: 0.82 MG/DL
EGFR: 101 ML/MIN/1.73M2
ESTRADIOL SERPL-MCNC: 22 PG/ML
GLUCOSE QUALITATIVE U: NEGATIVE
GLUCOSE SERPL-MCNC: 68 MG/DL
HYALINE CASTS: 0 /LPF
KETONES URINE: NEGATIVE
LEUKOCYTE ESTERASE URINE: NEGATIVE
MAGNESIUM SERPL-MCNC: 2.2 MG/DL
MICROSCOPIC-UA: NORMAL
NITRITE URINE: NEGATIVE
PARATHYROID HORMONE INTACT: 33 PG/ML
PH URINE: 6
PHOSPHATE SERPL-MCNC: 2.5 MG/DL
POTASSIUM SERPL-SCNC: 5 MMOL/L
PROLACTIN SERPL-MCNC: 9.9 NG/ML
PROTEIN URINE: NEGATIVE
PSA FREE FLD-MCNC: 33 %
PSA FREE SERPL-MCNC: 1.01 NG/ML
PSA SERPL-MCNC: 3.09 NG/ML
RED BLOOD CELLS URINE: 2 /HPF
SODIUM SERPL-SCNC: 140 MMOL/L
SPECIFIC GRAVITY URINE: 1.02
SQUAMOUS EPITHELIAL CELLS: 0 /HPF
URATE SERPL-MCNC: 4.9 MG/DL
UROBILINOGEN URINE: NORMAL
WHITE BLOOD CELLS URINE: 1 /HPF

## 2022-08-08 LAB
CA-I SERPL-SCNC: 5.3 MG/DL
URINE CYTOLOGY: NORMAL

## 2022-08-10 ENCOUNTER — APPOINTMENT (OUTPATIENT)
Dept: UROLOGY | Facility: CLINIC | Age: 62
End: 2022-08-10

## 2022-08-10 DIAGNOSIS — N52.1 ERECTILE DYSFUNCTION DUE TO DISEASES CLASSIFIED ELSEWHERE: ICD-10-CM

## 2022-08-10 DIAGNOSIS — L29.1 PRURITUS SCROTI: ICD-10-CM

## 2022-08-10 DIAGNOSIS — C18.9 MALIGNANT NEOPLASM OF COLON, UNSPECIFIED: ICD-10-CM

## 2022-08-10 DIAGNOSIS — N41.9 INFLAMMATORY DISEASE OF PROSTATE, UNSPECIFIED: ICD-10-CM

## 2022-08-10 LAB
ESTROGEN SERPL-MCNC: 80 PG/ML
TESTOST FREE SERPL-MCNC: 8.5 PG/ML
TESTOST SERPL-MCNC: 641 NG/DL

## 2022-08-10 PROCEDURE — 99442: CPT

## 2022-08-10 NOTE — HISTORY OF PRESENT ILLNESS
[FreeTextEntry1] : Mr. Murdock is a 63 y/o M who today for an initial evaluation. 3 years ago he was diagnosed with colon cancer. 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 He under went chemotherapy and as of today he is cancer free. He has seen another provider who measured his PSA and it is reported that his PSA has increased from 1 to 2 over a two year period. Prostate is enlarged 5.5 cm, kidney normal size, no renal mass as per a CT scan completed 4/3/19. The CT scan was completed to monitor the colon cancer. He notes seeing me today as oppose to his previous provider due to a change in insurance. Regarding his urination he reports not having any issues. When drinking a lot of tea during the evening he will wake up 2-3 x a night to urinate. Otherwise he will not wake up at all or only once at night. He denies dysuria, gross hematuria, urgency or incontinence. Stones in the gal bladder are reported as well. He is also concerned about itching of the scrotum. Additionally he c/o neuropathy in the feet bilaterally which he believes as a result of chemotherapy. \par 12/2/19: Patient presents today for a follow up. Pt states he sees his oncologist once a month to get blood work. Patients hematocrit numbers are from 3/19 was 48.6, 6/19 was 48.4 and 8/19 was 40.1. Left lower lobe colon cancer excised and remains cancer free. CT chest and abdomen from 10/16/19 shows thyroid nodules, stable 3mm left upper lobe nodule, right subcutaneous port, prostate was 5.9 cm. Patient denies dysuria, gross hematuria, urgency, or incontinence, pushing or straining. Pt states he wakes up about 1-2 times at night to urinate if he drinks a lot of tea before bed. \par \par 07/23/2020: Patient presents today for a follow up. On 12/03/19, PSA was 1.48, Testosterone was 395 and ALK PHOS was 118. 5 years ago pt was diagnosed with colon cancer. 2 years ago, 2 spots were found in the lungs. CT scan 2-3 months ago at [place] shows spots are increasing in size. Surgeon advised him to have spots removed. Pt had lung resection. Has had two rounds of chemotherapy. No urinary symptoms. No on medication for urine. Pt reports his sexual desire is low. Hx of stones. \par \par 12/21/2020: 60 yr male history of colon cancer with recurrent pulm metastatic disease following with Hemonc Dr David Murillo at [facility].  BPH , kidney stone that is indolent.  \par No urinary symptoms. \par PSA 1.48 December 2019 \par Recent Abd Pelvic CT with contrast 12/17/20 showing -\par Left upper and lower lung lobe wedge resection\par Right hemicolectomy \par 6.1cm prostamegaly, intravesical protrusion \par +FHx kidney stones in father. \par \par 08/10/2021: Patient presents today for follow up. Had CT scan two weeks ago for hx of metastatic colon cancer which was normal. Has ventral fat-containing hernia that he will have repaired. Atelectasis or scarring of left lung base, stable. Stable post operative changes related to prior left upper lobe and left lower lobe wedge resections. Liver shows no hepatic masses. Thyroid had multiple nodules. Erections are normal. Not on medications for erections. PSA 12/21/20 was 1.76. Patient states he had an episode of burning and incontinence. He went to see PCP who prescribed him Cipro x 3 days which resolved the pain. However, after stopping, burning pain returned but no longer has incontinence. \par \par 01/12/2022: Patient presents today for follow up. Recently had CT scan for hx of metastatic colon cancer. Sees oncologist every 2 months when he has port washed and has blood work. Wakes 1x at night to urinate. Does have urinary urgency during the day and leaks a little. No gross hematuria. Reports he once had kidney stone seen on CT but not on recent one. Did not see a stone pass. States he will have a period of time where his sexual desire is low for 1.5 months, but will return. On 8/10/21, testosterone was normal. Provided lab work done by oncologist Dr. Chidi Larios showing WBC count 4160, Hb 16, Hct 47.2, platelets 162,000. \par \par 08/10/2022: The patient presents today for a follow up telephone visit. He wanted to discuss his most recent laboratory work and discuss why I wanted an MRI of the prostate. Patient had laboratory work done on 8/4/2022. PSA was 3.09. I explained while this not that high, this rise is too rapid, it is about 1.05 ng/ml per year, therefore I am sending the patient for an MRI of the prostate. Blood work of same date revealed a good creatinine of 0.82. All other blood chemistries were within normal range. The patient feels good. He did have COVID  about a month ago.

## 2022-08-10 NOTE — ADDENDUM
[FreeTextEntry1] : This note was authored by Alie Alva working as a scribe for Dr.Gary Andrade. I, Dr. Chris Andrade have reviewed the content of this note and confirm it is true and accurate. I personally performed the history and physical examination and made all the decisions 08/10/2022.

## 2022-08-10 NOTE — ASSESSMENT
[FreeTextEntry1] : Mr. Murdock is a 61 y/o M who presents today for an initial evaluation. 3 years ago he was diagnosed with colon cancer. 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 He under went chemotherapy and as of today he is cancer free. He has seen another provider who measured his PSA and it is reported that his PSA has increased from 1 to 2 over a two year period. Prostate is enlarged 5.5 cm, kidney normal size, no renal mass as per a CT scan completed 4/3/19. The CT scan was completed to monitor the colon cancer. He notes seeing me today as oppose to his previous provider due to a change in insurance. Regarding his urination he reports not having any issues. When drinking a lot of tea during the evening he will wake up 2-3 x a night to urinate. Otherwise he will not wake up at all or only once at night. He denies dysuria, gross hematuria, urgency or incontinence. Stones in the gal bladder are reported as well. He is also concerned about itching of the scrotum. Additionally he c/o neuropathy in the feet bilaterally which he believes as a result of chemotherapy. \par 12/2/19: Patient presents today for a follow up. Pt states he sees his oncologist once a month to get blood work. Patients hematocrit numbers are from 3/19 was 48.6, 6/19 was 48.4 and 8/19 was 40.1. Left lower lobe colon cancer excised and remains cancer free. CT chest and abdomen from 10/16/19 shows thyroid nodules, stable 3mm left upper lobe nodule, right subcutaneous port, prostate was 5.9 cm. Patient denies dysuria, gross hematuria, urgency, or incontinence, pushing or straining. Pt states he wakes up about 1-2 times at night to urinate if he drinks a lot of tea before bed. \par \par 07/23/2020: Patient presents today for a follow up. On 12/03/19, PSA was 1.48, Testosterone was 395 and ALK PHOS was 118. 5 years ago pt was diagnosed with colon cancer. 2 years ago, 2 spots were found in the lungs. CT scan 2-3 months ago at [place] shows spots are increasing in size. Surgeon advised him to have spots removed. Pt had lung resection. Has had two rounds of chemotherapy. No urinary symptoms. No on medication for urine. Pt reports his sexual desire is low. Hx of stones. \par \par 12/21/2020: 60 yr male status post right hemicolectomy for colon cancer, lung mets status post left upper and lower lobe wedge resection.  \par BPH no urinary symptoms. \par No ED. \par \par 08/10/2021: Patient presents today for follow up. Had CT scan two weeks ago for hx of metastatic colon cancer which was normal. Has ventral fat-containing hernia that he will have repaired. Atelectasis or scarring of left lung base, stable. Stable post operative changes related to prior left upper lobe and left lower lobe wedge resections. Liver shows no hepatic masses. Thyroid had multiple nodules. Erections are normal. Not on medications for erections. PSA 12/21/20 was 1.76. Patient states he had an episode of burning and incontinence. He went to see PCP who prescribed him Cipro x 3 days which resolved the pain. However, after stopping, burning pain returned but no longer has incontinence. \par \par 01/12/2022: Patient presents today for follow up. Recently had CT scan for hx of metastatic colon cancer. Sees oncologist every 2 months when he has port washed and has blood work. Wakes 1x at night to urinate. Does have urinary urgency during the day and leaks a little. No gross hematuria. Reports he once had kidney stone seen on CT but not on recent one. Did not see a stone pass. States he will have a period of time where his sexual desire is low for 1.5 months, but will return. On 8/10/21, testosterone was normal. Provided lab work done by oncologist Dr. Chidi Larios showing WBC count 4160, Hb 16, Hct 47.2, platelets 162,000. \par \par Digital rectal exam found no suspicious rectal masses. No rectal mucosal lesions. Anal tone is normal. The prostate is non tender, with normal texture, discrete borders, and no nodules. It is a 30 gram transurethral resection size. I can get above the prostate. Normal seminal vesicles. Wider left to right than high inferior to superior. No gross blood on examining fingers. \par Blood work today includes CBC, CMP, cortisol PM, androstenedione, dehydroepiandrosterone, dehydroepiandrosterone-sulfate, dihydrotestosterone, estradiol, estrogen, FSH, LH, progesterone, prolactin, TSH, PSA, and testosterone. \par The patient produced a urine sample which will be sent for urinalysis, urine cytology, and urine culture. \par RTO in 6 months for follow up or sooner if new urinary symptoms develop. \par \par 08/10/2022: The patient presents today for a follow up telephone visit. He wanted to discuss his most recent laboratory work and discuss why I wanted an MRI of the prostate. Patient had laboratory work done on 8/4/2022. PSA was 3.09. I explained while this not that high, this rise is too rapid, it is about 1.05 ng/ml per year, therefore I am sending the patient for an MRI of the prostate. Blood work of same date revealed a good creatinine of 0.82. All other blood chemistries were within normal range. The patient feels good. He did have covid about a month ago. \par \par Patient will go for an MRI of the prostate. He understands the rationale and reason I ordered it. \par \par Patient will schedule a telehealth visit about a week after obtaining the MRI so we can review and discuss the results. \par \par Duration of telephone visit: 13 minutes.

## 2022-08-10 NOTE — HISTORY OF PRESENT ILLNESS
[FreeTextEntry1] : Mr. Murdock is a 60y/o M who today for an initial evaluation. 3 years ago he was diagnosed with colon cancer. 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 He under went chemotherapy and as of today he is cancer free. He has seen another provider who measured his PSA and it is reported that his PSA has increased from 1 to 2 over a two year period. Prostate is enlarged 5.5 cm, kidney normal size, no renal mass as per a CT scan completed 4/3/19. The CT scan was completed to monitor the colon cancer. He notes seeing me today as oppose to his previous provider due to a change in insurance. Regarding his urination he reports not having any issues. When drinking a lot of tea during the evening he will wake up 2-3 x a night to urinate. Otherwise he will not wake up at all or only once at night. He denies dysuria, gross hematuria, urgency or incontinence. Stones in the gal bladder are reported as well. He is also concerned about itching of the scrotum. Additionally he c/o neuropathy in the feet bilaterally which he believes as a result of chemotherapy. \par 12/2/19: Patient presents today for a follow up. Pt states he sees his oncologist once a month to get blood work. Patients hematocrit numbers are from 3/19 was 48.6, 6/19 was 48.4 and 8/19 was 40.1. Left lower lobe colon cancer excised and remains cancer free. CT chest and abdomen from 10/16/19 shows thyroid nodules, stable 3mm left upper lobe nodule, right subcutaneous port, prostate was 5.9 cm. Patient denies dysuria, gross hematuria, urgency, or incontinence, pushing or straining. Pt states he wakes up about 1-2 times at night to urinate if he drinks a lot of tea before bed. \par \par 07/23/2020: Patient presents today for a follow up. On 12/03/19, PSA was 1.48, Testosterone was 395 and ALK PHOS was 118. 5 years ago pt was diagnosed with colon cancer. 2 years ago, 2 spots were found in the lungs. CT scan 2-3 months ago at [place] shows spots are increasing in size. Surgeon advised him to have spots removed. Pt had lung resection. Has had two rounds of chemotherapy. No urinary symptoms. No on medication for urine. Pt reports his sexual desire is low. Hx of stones. \par \par 12/21/2020: 60 yr male history of colon cancer with recurrent pulm metastatic disease following with Hemonc Dr David Murillo at [facility].  BPH , kidney stone that is indolent.  \par No urinary symptoms. \par PSA 1.48 December 2019 \par Recent Abd Pelvic CT with contrast 12/17/20 showing -\par Left upper and lower lung lobe wedge resection\par Right hemicolectomy \par 6.1cm prostamegaly, intravesical protrusion \par +FHx kidney stones in father. \par \par 08/10/2021: Patient presents today for follow up. Had CT scan two weeks ago for hx of metastatic colon cancer which was normal. Has ventral fat-containing hernia that he will have repaired. Atelectasis or scarring of left lung base, stable. Stable post operative changes related to prior left upper lobe and left lower lobe wedge resections. Liver shows no hepatic masses. Thyroid had multiple nodules. Erections are normal. Not on medications for erections. PSA 12/21/20 was 1.76. Patient states he had an episode of burning and incontinence. He went to see PCP who prescribed him Cipro x 3 days which resolved the pain. However, after stopping, burning pain returned but no longer has incontinence. \par \par 01/12/2022: Patient presents today for follow up. Recently had CT scan for hx of metastatic colon cancer. Sees oncologist every 2 months when he has port washed and has blood work. Wakes 1x at night to urinate. Does have urinary urgency during the day and leaks a little. No gross hematuria. Reports he once had kidney stone seen on CT but not on recent one. Did not see a stone pass. States he will have a period of time where his sexual desire is low for 1.5 months, but will return. On 8/10/21, testosterone was normal. Provided lab work done by oncologist Dr. Chidi Larios showing WBC count 4160, Hb 16, Hct 47.2, platelets 162,000.

## 2022-08-10 NOTE — ASSESSMENT
[FreeTextEntry1] : Mr. Murdock is a 60y/o M who presents today for an initial evaluation. 3 years ago he was diagnosed with colon cancer. 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 He under went chemotherapy and as of today he is cancer free. He has seen another provider who measured his PSA and it is reported that his PSA has increased from 1 to 2 over a two year period. Prostate is enlarged 5.5 cm, kidney normal size, no renal mass as per a CT scan completed 4/3/19. The CT scan was completed to monitor the colon cancer. He notes seeing me today as oppose to his previous provider due to a change in insurance. Regarding his urination he reports not having any issues. When drinking a lot of tea during the evening he will wake up 2-3 x a night to urinate. Otherwise he will not wake up at all or only once at night. He denies dysuria, gross hematuria, urgency or incontinence. Stones in the gal bladder are reported as well. He is also concerned about itching of the scrotum. Additionally he c/o neuropathy in the feet bilaterally which he believes as a result of chemotherapy. \par 12/2/19: Patient presents today for a follow up. Pt states he sees his oncologist once a month to get blood work. Patients hematocrit numbers are from 3/19 was 48.6, 6/19 was 48.4 and 8/19 was 40.1. Left lower lobe colon cancer excised and remains cancer free. CT chest and abdomen from 10/16/19 shows thyroid nodules, stable 3mm left upper lobe nodule, right subcutaneous port, prostate was 5.9 cm. Patient denies dysuria, gross hematuria, urgency, or incontinence, pushing or straining. Pt states he wakes up about 1-2 times at night to urinate if he drinks a lot of tea before bed. \par \par 07/23/2020: Patient presents today for a follow up. On 12/03/19, PSA was 1.48, Testosterone was 395 and ALK PHOS was 118. 5 years ago pt was diagnosed with colon cancer. 2 years ago, 2 spots were found in the lungs. CT scan 2-3 months ago at [place] shows spots are increasing in size. Surgeon advised him to have spots removed. Pt had lung resection. Has had two rounds of chemotherapy. No urinary symptoms. No on medication for urine. Pt reports his sexual desire is low. Hx of stones. \par \par 12/21/2020: 60 yr male status post right hemicolectomy for colon cancer, lung mets status post left upper and lower lobe wedge resection.  \par BPH no urinary symptoms. \par No ED. \par \par 08/10/2021: Patient presents today for follow up. Had CT scan two weeks ago for hx of metastatic colon cancer which was normal. Has ventral fat-containing hernia that he will have repaired. Atelectasis or scarring of left lung base, stable. Stable post operative changes related to prior left upper lobe and left lower lobe wedge resections. Liver shows no hepatic masses. Thyroid had multiple nodules. Erections are normal. Not on medications for erections. PSA 12/21/20 was 1.76. Patient states he had an episode of burning and incontinence. He went to see PCP who prescribed him Cipro x 3 days which resolved the pain. However, after stopping, burning pain returned but no longer has incontinence. \par \par 01/12/2022: Patient presents today for follow up. Recently had CT scan for hx of metastatic colon cancer. Sees oncologist every 2 months when he has port washed and has blood work. Wakes 1x at night to urinate. Does have urinary urgency during the day and leaks a little. No gross hematuria. Reports he once had kidney stone seen on CT but not on recent one. Did not see a stone pass. States he will have a period of time where his sexual desire is low for 1.5 months, but will return. On 8/10/21, testosterone was normal. Provided lab work done by oncologist Dr. Chidi Larios showing WBC count 4160, Hb 16, Hct 47.2, platelets 162,000. \par \par Digital rectal exam found no suspicious rectal masses. No rectal mucosal lesions. Anal tone is normal. The prostate is non tender, with normal texture, discrete borders, and no nodules. It is a 30 gram transurethral resection size. I can get above the prostate. Normal seminal vesicles. Prostate is wider  left to right than high inferior to superior. No gross blood on examining fingers. \par \par Blood work today includes CBC, CMP, cortisol PM, androstenedione, dehydroepiandrosterone, dehydroepiandrosterone-sulfate, dihydrotestosterone, estradiol, estrogen, FSH, LH, progesterone, prolactin, TSH, PSA, and testosterone. \par \par The patient produced a urine sample which will be sent for urinalysis, urine cytology, and urine culture. \par \par RTO in 6 months for follow up or sooner if new urinary symptoms develop. \par \par Preparation, in-person office visit, and coordination of care took: 30 minutes

## 2022-08-10 NOTE — ADDENDUM
[FreeTextEntry1] : I, Kristi Metcalfin, acted solely as a scribe for Dr. Chris Andrade on this date 01/12/2022.\par \par All medical record entries made by the Scribe were at my, Dr. Chris Andrade, direction and personally dictated by me on 01/12/2022. I have reviewed the chart and agree that the record accurately reflects my personal performance of the history, physical exam, assessment and plan.  I have also personally directed, reviewed and agreed with the chart.

## 2022-08-10 NOTE — PHYSICAL EXAM
[General Appearance - Well Developed] : well developed [Normal Appearance] : normal appearance [General Appearance - In No Acute Distress] : no acute distress [Bowel Sounds] : normal bowel sounds [Skin Color & Pigmentation] : normal skin color and pigmentation [Heart Rate And Rhythm] : Heart rate and rhythm were normal [Edema] : no peripheral edema [] : no respiratory distress [Oriented To Time, Place, And Person] : oriented to person, place, and time [Normal Station and Gait] : the gait and station were normal for the patient's age [No Focal Deficits] : no focal deficits [FreeTextEntry1] : Digital rectal exam found no suspicious rectal masses. No rectal mucosal lesions. Anal tone is normal. The prostate is non tender, with normal texture, discrete borders, and no nodules. It is a 30 gram transurethral resection size. I can get above the prostate. Normal seminal vesicles. Wider left to right than high inferior to superior. No gross blood on examining fingers.

## 2022-08-11 LAB — ANDROSTANOLONE SERPL-MCNC: 77 NG/DL

## 2022-08-12 NOTE — PHYSICAL EXAM
[General Appearance - Well Developed] : well developed [Normal Appearance] : normal appearance [General Appearance - In No Acute Distress] : no acute distress [Bowel Sounds] : normal bowel sounds [Skin Color & Pigmentation] : normal skin color and pigmentation [Heart Rate And Rhythm] : Heart rate and rhythm were normal [Edema] : no peripheral edema [] : no respiratory distress [Oriented To Time, Place, And Person] : oriented to person, place, and time [Normal Station and Gait] : the gait and station were normal for the patient's age [No Focal Deficits] : no focal deficits

## 2022-08-14 NOTE — ASSESSMENT
[FreeTextEntry1] : Mr. Murdock is a 63 y/o M who presents today for an initial evaluation. 3 years ago he was diagnosed with colon cancer. 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 He under went chemotherapy and as of today he is cancer free. He has seen another provider who measured his PSA and it is reported that his PSA has increased from 1 to 2 over a two year period. Prostate is enlarged 5.5 cm, kidney normal size, no renal mass as per a CT scan completed 4/3/19. The CT scan was completed to monitor the colon cancer. He notes seeing me today as oppose to his previous provider due to a change in insurance. Regarding his urination he reports not having any issues. When drinking a lot of tea during the evening he will wake up 2-3 x a night to urinate. Otherwise he will not wake up at all or only once at night. He denies dysuria, gross hematuria, urgency or incontinence. Stones in the gal bladder are reported as well. He is also concerned about itching of the scrotum. Additionally he c/o neuropathy in the feet bilaterally which he believes as a result of chemotherapy. \par 12/2/19: Patient presents today for a follow up. Pt states he sees his oncologist once a month to get blood work. Patients hematocrit numbers are from 3/19 was 48.6, 6/19 was 48.4 and 8/19 was 40.1. Left lower lobe colon cancer excised and remains cancer free. CT chest and abdomen from 10/16/19 shows thyroid nodules, stable 3mm left upper lobe nodule, right subcutaneous port, prostate was 5.9 cm. Patient denies dysuria, gross hematuria, urgency, or incontinence, pushing or straining. Pt states he wakes up about 1-2 times at night to urinate if he drinks a lot of tea before bed. \par \par 07/23/2020: Patient presents today for a follow up. On 12/03/19, PSA was 1.48, Testosterone was 395 and ALK PHOS was 118. 5 years ago pt was diagnosed with colon cancer. 2 years ago, 2 spots were found in the lungs. CT scan 2-3 months ago at [facility] shows spots are increasing in size. Surgeon advised him to have spots removed. Pt had lung resection. Has had two rounds of chemotherapy. No urinary symptoms. No on medication for urine. Pt reports his sexual desire is low. Hx of stones. \par \par 12/21/2020: 60 yr male status post right hemicolectomy for colon cancer, lung mets status post left upper and lower lobe wedge resection.  \par BPH no urinary symptoms. \par No ED. \par \par 08/10/2021: Patient presents today for follow up. Had CT scan two weeks ago for hx of metastatic colon cancer which was normal. Has ventral fat-containing hernia that he will have repaired. Atelectasis or scarring of left lung base, stable. Stable post operative changes related to prior left upper lobe and left lower lobe wedge resections. Liver shows no hepatic masses. Thyroid had multiple nodules. Erections are normal. Not on medications for erections. PSA 12/21/20 was 1.76. Patient states he had an episode of burning and incontinence. He went to see PCP who prescribed him Cipro x 3 days which resolved the pain. However, after stopping, burning pain returned but no longer has incontinence. \par \par 01/12/2022: Patient presents today for follow up. Recently had CT scan for hx of metastatic colon cancer. Sees oncologist every 2 months when he has port washed and has blood work. Wakes 1x at night to urinate. Does have urinary urgency during the day and leaks a little. No gross hematuria. Reports he once had kidney stone seen on CT but not on recent one. Did not see a stone pass. States he will have a period of time where his sexual desire is low for 1.5 months, but will return. On 8/10/21, testosterone was normal. Provided lab work done by oncologist Dr. Chidi Larios showing WBC count 4160, Hb 16, Hct 47.2, platelets 162,000. \par \par Digital rectal exam found no suspicious rectal masses. No rectal mucosal lesions. Anal tone is normal. The prostate is non tender, with normal texture, discrete borders, and no nodules. It is a 30 gram transurethral resection size. I can get above the prostate. Normal seminal vesicles. Prostate is wider  left to right than high inferior to superior. No gross blood on examining fingers. \par \par Blood work today includes CBC, CMP, cortisol PM, androstenedione, dehydroepiandrosterone, dehydroepiandrosterone-sulfate, dihydrotestosterone, estradiol, estrogen, FSH, LH, progesterone, prolactin, TSH, PSA, and testosterone. \par The patient produced a urine sample which will be sent for urinalysis, urine cytology, and urine culture. \par RTO in 6 months for follow up or sooner if new urinary symptoms develop. \par \par 08/04/2022:  presents today for a 6 month follow up. Pt had a right hemicolectomy in 2015 for colon cancer. Has had two operations on the lungs for color cancer metastasis. Has a total of 3 operations of color cancer and its metastasis. He is currently disease free. Has had 2 rounds of chemotherapy. Two months ago he did have covid. Two days ago he developed an upper respiratory tract infection for which he has started amoxicillin and a steroid. Had been on a z-pack previously. Urination is currently good. Nocturia x0-1. Denies dysuria, pushing, straining, gross hematuria, & urinary urgency. Erections are quite good. Denies FHx of prostate cancer. Father has BPH with bladder outlet obstruction symptoms which is being managed. \par \par The patient produced a urine sample which will be sent for urinalysis, urine cytology, and urine culture. \par \par Blood work today included PSA profile, prolactin, estradiol, PTH, BMP, Phosphorus, albumin, alk phos, magnesium, uric acid, testosterone, calcium, estrogen, and dihydrotestosterone. \par \par Patient will follow up in 6 months with a telehealth visit. \par \par Preparation, in person office visit, and coordination of care: 40 minutes.

## 2022-08-14 NOTE — ADDENDUM
[FreeTextEntry1] : This note was authored by Alie Alva working as a scribe for Dr.Gary Andrade. I, Dr. Chris Andrade have reviewed the content of this note and confirm it is true and accurate. I personally performed the history and physical examination and made all the decisions 08/12/2022.

## 2022-08-14 NOTE — HISTORY OF PRESENT ILLNESS
[FreeTextEntry1] : Mr. Murdock is a 61 y/o M who today for an initial evaluation. 3 years ago he was diagnosed with colon cancer. 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 He under went chemotherapy and as of today he is cancer free. He has seen another provider who measured his PSA and it is reported that his PSA has increased from 1 to 2 over a two year period. Prostate is enlarged 5.5 cm, kidney normal size, no renal mass as per a CT scan completed 4/3/19. The CT scan was completed to monitor the colon cancer. He notes seeing me today as oppose to his previous provider due to a change in insurance. Regarding his urination he reports not having any issues. When drinking a lot of tea during the evening he will wake up 2-3 x a night to urinate. Otherwise he will not wake up at all or only once at night. He denies dysuria, gross hematuria, urgency or incontinence. Stones in the gal bladder are reported as well. He is also concerned about itching of the scrotum. Additionally he c/o neuropathy in the feet bilaterally which he believes as a result of chemotherapy. \par 12/2/19: Patient presents today for a follow up. Pt states he sees his oncologist once a month to get blood work. Patients hematocrit numbers are from 3/19 was 48.6, 6/19 was 48.4 and 8/19 was 40.1. Left lower lobe colon cancer excised and remains cancer free. CT chest and abdomen from 10/16/19 shows thyroid nodules, stable 3mm left upper lobe nodule, right subcutaneous port, prostate was 5.9 cm. Patient denies dysuria, gross hematuria, urgency, or incontinence, pushing or straining. Pt states he wakes up about 1-2 times at night to urinate if he drinks a lot of tea before bed. \par \par 07/23/2020: Patient presents today for a follow up. On 12/03/19, PSA was 1.48, Testosterone was 395 and ALK PHOS was 118. 5 years ago pt was diagnosed with colon cancer. 2 years ago, 2 spots were found in the lungs. CT scan 2-3 months ago at [facility] shows spots are increasing in size. Surgeon advised him to have spots removed. Pt had lung resection. Has had two rounds of chemotherapy. No urinary symptoms. No on medication for urine. Pt reports his sexual desire is low. Hx of stones. \par \par 12/21/2020: 60 yr male history of colon cancer with recurrent pulm metastatic disease following with Hemonc Dr David Murillo at [facility].  BPH , kidney stone that is indolent.  \par No urinary symptoms. \par PSA 1.48 December 2019 \par Recent Abd Pelvic CT with contrast 12/17/20 showing -\par Left upper and lower lung lobe wedge resection\par Right hemicolectomy \par 6.1cm prostamegaly, intravesical protrusion \par +FHx kidney stones in father. \par \par 08/10/2021: Patient presents today for follow up. Had CT scan two weeks ago for hx of metastatic colon cancer which was normal. Has ventral fat-containing hernia that he will have repaired. Atelectasis or scarring of left lung base, stable. Stable post operative changes related to prior left upper lobe and left lower lobe wedge resections. Liver shows no hepatic masses. Thyroid had multiple nodules. Erections are normal. Not on medications for erections. PSA 12/21/20 was 1.76. Patient states he had an episode of burning and incontinence. He went to see PCP who prescribed him Cipro x 3 days which resolved the pain. However, after stopping, burning pain returned but no longer has incontinence. \par \par 01/12/2022: Patient presents today for follow up. Recently had CT scan for hx of metastatic colon cancer. Sees oncologist every 2 months when he has port washed and has blood work. Wakes 1x at night to urinate. Does have urinary urgency during the day and leaks a little. No gross hematuria. Reports he once had kidney stone seen on CT but not on recent one. Did not see a stone pass. States he will have a period of time where his sexual desire is low for 1.5 months, but will return. On 8/10/21, testosterone was normal. Provided lab work done by oncologist Dr. Chidi Larios showing WBC count 4160, Hb 16, Hct 47.2, platelets 162,000. \par \par 08/04/2022:  presents today for a 6 month follow up. Pt had a right hemicolectomy in 2015 for colon cancer. Has had two operations on the lungs for color cancer metastasis. Has a total of 3 operations of color cancer and its metastasis. He is currently disease free. Has had 2 rounds of chemotherapy. Two months ago he did have covid. Two days ago he developed an upper respiratory tract infection for which he has started amoxicillin and a steroid. Had been on a z-pack previously. Urination is currently good. Nocturia x0-1. Denies dysuria, pushing, straining, gross hematuria, & urinary urgency. Erections are quite good. Denies FHx of prostate cancer. Father has BPH with bladder outlet obstruction symptoms which is being managed.

## 2023-01-29 DIAGNOSIS — E83.52 HYPERCALCEMIA: ICD-10-CM

## 2023-01-29 DIAGNOSIS — N40.1 BENIGN PROSTATIC HYPERPLASIA WITH LOWER URINARY TRACT SYMPMS: ICD-10-CM

## 2023-01-29 DIAGNOSIS — R39.15 URGENCY OF URINATION: ICD-10-CM

## 2023-01-29 DIAGNOSIS — R30.0 DYSURIA: ICD-10-CM

## 2023-01-29 DIAGNOSIS — R97.20 ELEVATED PROSTATE, SPECIFIC ANTIGEN [PSA]: ICD-10-CM

## 2023-01-29 DIAGNOSIS — N13.8 BENIGN PROSTATIC HYPERPLASIA WITH LOWER URINARY TRACT SYMPMS: ICD-10-CM

## 2023-01-29 DIAGNOSIS — N20.0 CALCULUS OF KIDNEY: ICD-10-CM

## 2023-01-29 DIAGNOSIS — R68.82 DECREASED LIBIDO: ICD-10-CM

## 2023-02-01 ENCOUNTER — APPOINTMENT (OUTPATIENT)
Dept: UROLOGY | Facility: CLINIC | Age: 63
End: 2023-02-01